# Patient Record
Sex: FEMALE | Race: WHITE | Employment: UNEMPLOYED | URBAN - METROPOLITAN AREA
[De-identification: names, ages, dates, MRNs, and addresses within clinical notes are randomized per-mention and may not be internally consistent; named-entity substitution may affect disease eponyms.]

---

## 2021-10-15 ENCOUNTER — OFFICE VISIT (OUTPATIENT)
Dept: FAMILY MEDICINE | Facility: CLINIC | Age: 41
End: 2021-10-15

## 2021-10-15 ENCOUNTER — ANCILLARY PROCEDURE (OUTPATIENT)
Dept: GENERAL RADIOLOGY | Facility: CLINIC | Age: 41
End: 2021-10-15
Attending: FAMILY MEDICINE

## 2021-10-15 ENCOUNTER — TELEPHONE (OUTPATIENT)
Dept: FAMILY MEDICINE | Facility: CLINIC | Age: 41
End: 2021-10-15

## 2021-10-15 VITALS
HEIGHT: 66 IN | OXYGEN SATURATION: 98 % | SYSTOLIC BLOOD PRESSURE: 114 MMHG | HEART RATE: 104 BPM | WEIGHT: 169 LBS | BODY MASS INDEX: 27.16 KG/M2 | TEMPERATURE: 98.2 F | DIASTOLIC BLOOD PRESSURE: 81 MMHG

## 2021-10-15 DIAGNOSIS — S22.41XA CLOSED FRACTURE OF MULTIPLE RIBS OF RIGHT SIDE, INITIAL ENCOUNTER: Primary | ICD-10-CM

## 2021-10-15 DIAGNOSIS — S22.41XA CLOSED FRACTURE OF MULTIPLE RIBS OF RIGHT SIDE, INITIAL ENCOUNTER: ICD-10-CM

## 2021-10-15 PROCEDURE — 71101 X-RAY EXAM UNILAT RIBS/CHEST: CPT | Mod: RT | Performed by: RADIOLOGY

## 2021-10-15 PROCEDURE — 99203 OFFICE O/P NEW LOW 30 MIN: CPT | Performed by: FAMILY MEDICINE

## 2021-10-15 RX ORDER — OXYCODONE HYDROCHLORIDE 10 MG/1
5 TABLET ORAL 2 TIMES DAILY PRN
Qty: 6 TABLET | Refills: 0 | Status: SHIPPED | OUTPATIENT
Start: 2021-10-15 | End: 2021-10-15

## 2021-10-15 RX ORDER — ACETAMINOPHEN 500 MG
500-1000 TABLET ORAL EVERY 6 HOURS PRN
COMMUNITY

## 2021-10-15 ASSESSMENT — MIFFLIN-ST. JEOR: SCORE: 1460.58

## 2021-10-15 NOTE — TELEPHONE ENCOUNTER
Contacted patient via telephone, confirmed speaker was patient Medina Modi.  Discussed reason for cancelling oxycodone prescription was overall lack of comfort as she was thought to have left the clinic without getting her Xray.  She stated that she didn't leave and was in the bathroom.  We discussed her Xray didn't show a fracture and that oxycodone wouldn't be appropriate treatment for a mild-moderate chest wall injury.  I apologized for cancelling her prescription, recommended she continue her current therapy and that she go to UC or ER if her pain worsens.    I was initially uncomfortable with the opiate prescription due to the fact she has no medical evidence in her chart of an anaphylactic reaction to NSAIDS and/or seizures in response to tramadol ingestion.  There is no allergy testing reported in her chart.  She has no medical encounters at all, neither with [place] nor with any other healthcare provider in the state.   review was done which obtained no results in any state in the last year.  In addition her chest XR showed no fracture as would be evident if she had actually fractured 3 ribs and suffered a lung contusion, which she reports was seen on her chest CT scan done in [facility], which is currently being sent here via mail.     Victor Hugo Farah MD

## 2021-10-15 NOTE — PROGRESS NOTES
"    Assessment & Plan       ICD-10-CM    1. Closed fracture of multiple ribs of right side, initial encounter  S22.41XA XR Ribs & Chest Right G/E 3 Views     DISCONTINUED: oxyCODONE IR (ROXICODONE) 10 MG tablet     No fracture appreciated on xray today  Recommend continue tylenol, rest      Review of external notes as documented elsewhere in note          There are no Patient Instructions on file for this visit.    Return in about 1 month (around 11/15/2021) for If symptoms persist.    Victor Hugo Farah MD  [facility] LUIZ Haney is a 41 year old who presents for the following health issues     HPI     ED/UC Followup:    Facility:  Urgent care in [facility]   Date of visit: 10/13/2021  Reason for visit: Right rib pain  Current Status: Still in a lot of pain. Any movement hurts     Rib fracture/bruised lung suffered on vacation   Taking 1000mg tylenol q6h  Lidocaine patch q12 h  6 tabs 10mg oxycodone q4 hours prn  Injury occurred in [facility] Xray was done and showed no fracture, CT scan showed rib fracture and bruised lung        Review of Systems   Constitutional, HEENT, cardiovascular, pulmonary, gi and gu systems are negative, except as otherwise noted.      Objective    /81   Pulse 104   Temp 98.2  F (36.8  C) (Oral)   Ht 1.68 m (5' 6.14\")   Wt 76.7 kg (169 lb)   SpO2 98%   BMI 27.16 kg/m    Body mass index is 27.16 kg/m .  Physical Exam   GENERAL: healthy, alert and no distress  EYES: Eyes grossly normal to inspection, PERRL and conjunctivae and sclerae normal  NECK: no adenopathy, no asymmetry, masses, or scars and thyroid normal to palpation  RESP: lungs clear to auscultation - no rales, rhonchi or wheezes  CV: regular rate and rhythm, normal S1 S2, no S3 or S4, no murmur, click or rub, no peripheral edema and peripheral pulses strong  MS: tenderness right chest wall  SKIN: no suspicious lesions or rashes  NEURO: Normal strength and tone, mentation intact and speech " normal  PSYCH: mentation appears normal, affect normal/bright

## 2021-10-15 NOTE — TELEPHONE ENCOUNTER
Reason for call:  Other   Patient called regarding (reason for call): Called in   Additional comments: she was asking why the script got cancelled.    She did the x-ray   Phone number to reach patient: [phone] please call that number its her cousins number    Best Time:    Can we leave a detailed message on this number?  Not Applicable    Travel screening: Not Applicable